# Patient Record
Sex: FEMALE | URBAN - METROPOLITAN AREA
[De-identification: names, ages, dates, MRNs, and addresses within clinical notes are randomized per-mention and may not be internally consistent; named-entity substitution may affect disease eponyms.]

---

## 2017-03-02 ENCOUNTER — APPOINTMENT (RX ONLY)
Dept: URBAN - METROPOLITAN AREA CLINIC 135 | Facility: CLINIC | Age: 37
Setting detail: DERMATOLOGY
End: 2017-03-02

## 2017-03-02 ENCOUNTER — RX ONLY (OUTPATIENT)
Age: 37
Setting detail: RX ONLY
End: 2017-03-02

## 2017-03-02 DIAGNOSIS — L85.3 XEROSIS CUTIS: ICD-10-CM

## 2017-03-02 DIAGNOSIS — L70.0 ACNE VULGARIS: ICD-10-CM

## 2017-03-02 PROCEDURE — ? PRESCRIPTION

## 2017-03-02 PROCEDURE — ? COUNSELING

## 2017-03-02 PROCEDURE — ? RECOMMENDATIONS

## 2017-03-02 PROCEDURE — 99213 OFFICE O/P EST LOW 20 MIN: CPT

## 2017-03-02 RX ORDER — TRETINOIN 0.05 G/100G
GEL TOPICAL
Qty: 1 | Refills: 3

## 2017-03-02 RX ORDER — HYDROCORTISONE VALERATE 2 MG/G
CREAM TOPICAL
Qty: 1 | Refills: 0 | COMMUNITY
Start: 2017-03-02

## 2017-03-02 RX ORDER — MINOCYCLINE HYDROCHLORIDE 50 MG/1
TABLET ORAL
Qty: 60 | Refills: 2

## 2017-03-02 RX ORDER — MINOCYCLINE HYDROCHLORIDE 50 MG/1
TABLET ORAL
Qty: 60 | Refills: 0 | COMMUNITY
Start: 2017-03-02

## 2017-03-02 RX ADMIN — MINOCYCLINE HYDROCHLORIDE: 50 TABLET ORAL at 15:59

## 2017-03-02 RX ADMIN — HYDROCORTISONE VALERATE: 2 CREAM TOPICAL at 16:01

## 2017-03-02 NOTE — PROCEDURE: RECOMMENDATIONS
Recommendation Preamble: The following recommendations were made during the visit:
Recommendations (Free Text): continue cerave
Detail Level: Zone
Recommendations (Free Text): Stop topical steroids to the face. Recommend isotretinoin

## 2017-07-10 ENCOUNTER — RX ONLY (OUTPATIENT)
Age: 37
Setting detail: RX ONLY
End: 2017-07-10

## 2017-07-10 RX ORDER — DAPSONE 50 MG/G
GEL TOPICAL
Qty: 1 | Refills: 2

## 2017-07-10 RX ORDER — CERAMIDES 1,3,6-II
CREAM (GRAM) TOPICAL
Qty: 2 | Refills: 3

## 2017-07-10 RX ORDER — TRETINOIN 0.5 MG/G
CREAM TOPICAL
Qty: 1 | Refills: 3 | COMMUNITY
Start: 2017-07-10